# Patient Record
Sex: FEMALE | NOT HISPANIC OR LATINO | Employment: PART TIME | URBAN - METROPOLITAN AREA
[De-identification: names, ages, dates, MRNs, and addresses within clinical notes are randomized per-mention and may not be internally consistent; named-entity substitution may affect disease eponyms.]

---

## 2022-01-02 ENCOUNTER — APPOINTMENT (OUTPATIENT)
Dept: GENERAL RADIOLOGY | Facility: CLINIC | Age: 32
End: 2022-01-02
Attending: EMERGENCY MEDICINE

## 2022-01-02 ENCOUNTER — HOSPITAL ENCOUNTER (EMERGENCY)
Facility: CLINIC | Age: 32
Discharge: HOME | End: 2022-01-03
Attending: EMERGENCY MEDICINE | Admitting: EMERGENCY MEDICINE
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
## 2022-01-02 PROCEDURE — 250N000011 HC RX IP 250 OP 636: Performed by: EMERGENCY MEDICINE

## 2022-01-02 PROCEDURE — 250N000013 HC RX MED GY IP 250 OP 250 PS 637: Performed by: EMERGENCY MEDICINE

## 2022-01-02 PROCEDURE — 73130 X-RAY EXAM OF HAND: CPT | Mod: 50

## 2022-01-02 PROCEDURE — 96374 THER/PROPH/DIAG INJ IV PUSH: CPT | Mod: 59

## 2022-01-02 PROCEDURE — 29130 APPL FINGER SPLINT STATIC: CPT | Mod: F4

## 2022-01-02 PROCEDURE — 99284 EMERGENCY DEPT VISIT MOD MDM: CPT | Mod: 25

## 2022-01-02 RX ORDER — KETOROLAC TROMETHAMINE 15 MG/ML
15 INJECTION, SOLUTION INTRAMUSCULAR; INTRAVENOUS ONCE
Status: COMPLETED | OUTPATIENT
Start: 2022-01-02 | End: 2022-01-02

## 2022-01-02 RX ORDER — OXYCODONE AND ACETAMINOPHEN 5; 325 MG/1; MG/1
2 TABLET ORAL ONCE
Status: COMPLETED | OUTPATIENT
Start: 2022-01-02 | End: 2022-01-02

## 2022-01-02 RX ADMIN — KETOROLAC TROMETHAMINE 15 MG: 15 INJECTION, SOLUTION INTRAMUSCULAR; INTRAVENOUS at 22:43

## 2022-01-02 RX ADMIN — AMOXICILLIN AND CLAVULANATE POTASSIUM 1 TABLET: 875; 125 TABLET, FILM COATED ORAL at 22:44

## 2022-01-02 RX ADMIN — OXYCODONE HYDROCHLORIDE AND ACETAMINOPHEN 2 TABLET: 5; 325 TABLET ORAL at 23:49

## 2022-01-02 ASSESSMENT — MIFFLIN-ST. JEOR: SCORE: 1428.96

## 2022-01-03 VITALS
DIASTOLIC BLOOD PRESSURE: 78 MMHG | WEIGHT: 155 LBS | BODY MASS INDEX: 25.83 KG/M2 | SYSTOLIC BLOOD PRESSURE: 117 MMHG | HEIGHT: 65 IN | RESPIRATION RATE: 20 BRPM | OXYGEN SATURATION: 99 % | TEMPERATURE: 97.7 F | HEART RATE: 89 BPM

## 2022-01-03 NOTE — DISCHARGE INSTRUCTIONS
Take the prescribed antibiotic to reduce your risk of developing wound infection.  You can use ibuprofen and Tylenol as needed for pain.  [facility] with [facility] regarding your finger wound, especially if there are any signs of infection developing.

## 2022-01-03 NOTE — ED PROVIDER NOTES
"History   Chief Complaint:  Cat bites    HPI   History supplemented by electronic chart review    Douglas Leung is a 31 year old female who presents by EMS for evaluation of cat bites.  She states that just prior to arrival, she was at home with her 8-year-old child when she could tell that her cat was angry, and the cat was about to attack her child when she got in the way, and the cat bit the patient instead, with some bites to her right hand, left hand including left small finger, and several scratches to her lower legs and forehead.  She thinks her tetanus is up-to-date.  She was given 50 mcg of fentanyl by EMS.  No allergies to antibiotics.  Bleeding controlled prior to arrival.  She has discomfort to the site of the bites.  She has otherwise been feeling fine lately and has had no fevers, cough, trouble breathing, vomiting, or other symptoms.    Review of Systems  All other systems reviewed and negative except as above in HPI.    Allergies:  No Known Allergies     Medications:    No daily medications    Past Medical History:    No past medical history on file.    There are no problems to display for this patient.       Past Surgical History:    No past surgical history on file.     Family History:    family history is not on file.    Social History:   Lives with 9yo child    Physical Exam   Patient Vitals for the past 24 hrs:   BP Temp Temp src Pulse Resp SpO2 Height Weight   01/02/22 2125 121/72 97.7  F (36.5  C) Temporal 85 18 96 % 1.651 m (5' 5\") 70.3 kg (155 lb)      Physical Exam  General: Nontoxic-appearing woman sitting upright in room 27, wearing white closed with dried blood   CV: rate as above, no active bleeding, extremities well perfused, compartments soft in all extremities, normal bilateral radial pulses  Resp: normal respiratory effort  MSK: no surrounding bony tenderness or deformity, mild swelling to dorsum of right proximal hand around site of bites  Skin: Superficial scratches to " left forehead as well as bilateral lower legs without active bleeding.  To somewhat deeper laceration to the dorsum of the proximal right hand, no visible tendon or bone.  Laceration to the ulnar aspect of the left small finger overlying the middle phalanx without visible tendon or bone  Neuro: alert, surrounding sensation and motor function intact  Psych: cooperative and anxious    Emergency Department Course     Imaging:  XR Hand Bilateral G/E 3 Views   Final Result   IMPRESSION: Soft tissue swelling of the left fifth digit which is flexed at the proximal interphalangeal joint on all 3 images. There are a few lucencies in the dorsum of the soft tissues of the left hand consistent with gas and penetrating bite injury.    No displaced left hand fracture or radiopaque foreign body. Right hand demonstrates some soft tissue swelling in the region of the thenar eminence with a single tiny hypodensity which could reflect a small locule of gas. There is also swelling dorsally    over the metacarpals. No displaced right hand fracture or radiopaque foreign body.           Laboratory:  Labs Ordered and Resulted from Time of ED Arrival to Time of ED Departure - No data to display     Procedures:  Procedure - digital block and steri strips  Performed by: ASAEL Christy MD  After verbal consent was obtained from the patient, I used a 27-gauge needle to perform a digital block of the left small finger using 0.5% bupivacaine without epinephrine.  She then underwent meticulous wound care and cleansing by the ER tech of her various wounds.  I then returned to place quarter-inch Steri-Strips across the largest of the wounds on the dorsum of her right hand, and also placed several quarter inch Steri-Strips across her left small finger wound, with good wound edge approximation and hemostasis.  The patient tolerated the procedure well and there were no apparent complications.      Emergency Department Course:  Reviewed:  I reviewed  nursing notes, vitals, and past medical history    Assessments:  I obtained history and examined the patient as noted above.          Consults:   See above in ED Course    Interventions:  Medications   ketorolac (TORADOL) injection 15 mg (15 mg Intravenous Given 1/2/22 2243)   amoxicillin-clavulanate (AUGMENTIN) 875-125 MG per tablet 1 tablet (1 tablet Oral Given 1/2/22 2244)   oxyCODONE-acetaminophen (PERCOCET) 5-325 MG per tablet 2 tablet (2 tablets Oral Given 1/2/22 2349)      Disposition:  Discharged  Impression & Plan    Covid-19  Douglas Leung was evaluated during a global COVID-19 pandemic, which necessitated consideration that the patient might be at risk for infection with the SARS-CoV-2 virus that causes COVID-19.   Applicable protocols for evaluation were followed during the patient's care.   COVID-19 was considered as part of the patient's evaluation.     Medical Decision Making:  She presents with various injuries from cat scratches and several bite wounds.  Fortunately, x-ray does not reveal any worrisome radiopaque foreign body or bony injury.  She is neurovascularly intact.  We did discuss that she is at fairly high risk for infection given the nature of these injuries from a cat, and she agrees with the rationale for initiation of prophylactic antibiotics.  She was advised that there is still some risk of infection and she should return for spreading redness, high fevers, or other concerns.  She should use over-the-counter analgesics as needed.  I explained that one of the wounds on the dorsum of her right hand, and the wound on her left small finger, are large enough that they could potentially be sutured, though I do not think that sutures are in her best interest given the increased risk of wound infection if sutures were to be placed.  All questions answered prior to her discharge home.  Her left finger will be placed in AlumaFoam splint for soft tissue rest and she was given referral  information for follow-up with orthopedic hand specialty if wound healing concerns.    Diagnosis:    ICD-10-CM    1. Cat bite of multiple sites  W55.01XA         Discharge Medications:     Medication List      Started    amoxicillin-clavulanate 875-125 MG tablet  Commonly known as: AUGMENTIN  1 tablet, Oral, 2 TIMES DAILY             1/3/2022   ASAEL Christy MD    This note was completed in part using Dragon voice recognition software. Although reviewed after completion, some word and grammatical errors may occur.           Jimenez Christy MD  01/03/22 0052

## 2022-01-03 NOTE — ED TRIAGE NOTES
[facility]  ED Arrival Note    Arrives via EMS. ABC's intact. A &O X4. . Pt arrives with c/o cat attack. Presents with bites and scratches to both hands and face. Bleeding controlled. Given 50 mcg of Fentanyl per EMS. Cat is under 1 yr old and has not had rabbis shots.       Visitors during triage: None      Triage Interventions: N/A    Ambulatory: Yes    Meets Stroke Criteria?: No    Meets Trauma Criteria?: No    Shock Index: <0.8, for provider reference    Directed to: Triage Lobby    Pronouns: she/her

## 2022-01-03 NOTE — ED NOTES
Emergency Department Technician Wound Irrigation Note:    1/3/2022    1:12 AM      Wound location:  Bilateral hands and forearms.    Irrigation Fluid: Normal Saline    Estimated Irrigation Volume (60 mL fluid per cm): 600 mL    Artemio Brunson

## 2023-05-30 PROCEDURE — 99285 EMERGENCY DEPT VISIT HI MDM: CPT | Mod: 25

## 2023-05-31 ENCOUNTER — HOSPITAL ENCOUNTER (EMERGENCY)
Facility: CLINIC | Age: 33
Discharge: HOME | End: 2023-05-31
Attending: EMERGENCY MEDICINE | Admitting: EMERGENCY MEDICINE

## 2023-05-31 VITALS
BODY MASS INDEX: 25.66 KG/M2 | OXYGEN SATURATION: 98 % | HEART RATE: 78 BPM | WEIGHT: 154 LBS | SYSTOLIC BLOOD PRESSURE: 115 MMHG | RESPIRATION RATE: 14 BRPM | TEMPERATURE: 99 F | HEIGHT: 65 IN | DIASTOLIC BLOOD PRESSURE: 78 MMHG

## 2023-05-31 DIAGNOSIS — F41.0 PANIC ATTACK: ICD-10-CM

## 2023-05-31 DIAGNOSIS — F41.9 ANXIETY: ICD-10-CM

## 2023-05-31 LAB — SARS-COV-2 RNA RESP QL NAA+PROBE: NEGATIVE

## 2023-05-31 PROCEDURE — 90791 PSYCH DIAGNOSTIC EVALUATION: CPT

## 2023-05-31 PROCEDURE — 87635 SARS-COV-2 COVID-19 AMP PRB: CPT | Performed by: EMERGENCY MEDICINE

## 2023-05-31 ASSESSMENT — ACTIVITIES OF DAILY LIVING (ADL)
ADLS_ACUITY_SCORE: 35

## 2023-05-31 ASSESSMENT — COLUMBIA-SUICIDE SEVERITY RATING SCALE - C-SSRS
TOTAL  NUMBER OF ABORTED OR SELF INTERRUPTED ATTEMPTS LIFETIME: NO
1. HAVE YOU WISHED YOU WERE DEAD OR WISHED YOU COULD GO TO SLEEP AND NOT WAKE UP?: NO
ATTEMPT LIFETIME: NO
2. HAVE YOU ACTUALLY HAD ANY THOUGHTS OF KILLING YOURSELF?: NO
TOTAL  NUMBER OF INTERRUPTED ATTEMPTS LIFETIME: NO
6. HAVE YOU EVER DONE ANYTHING, STARTED TO DO ANYTHING, OR PREPARED TO DO ANYTHING TO END YOUR LIFE?: NO

## 2023-05-31 NOTE — DISCHARGE INSTRUCTIONS
Aftercare Plan    [facility] SCHEDULING:  Today you were seen by a licensed mental health professional through [facility] and Transition sevices, [facility] (P)  for a crisis assessment in the Emergency Department at [facility].  It is recommended that you follow up with your estabished providers (psychiatrist, mental health therapist, and/or primary care doctor - as relevant) as soon as possible. Coordinators from [facility] will be calling you in the next 24-48 hours to ensure that you have the resources you need.  You can also contact [facility] coordinators directly at [phone].    You have been scheduled the following appointments:   Date: Tuesday, 6/27/2023  Time: 8:45 am - 10:00 am  Provider: Yisel Oliva  MSN  CNP,RN  Location: [facility], [address] [place] [id number]  Phone: [phone]  Type: Medication Mgmt - Initial (In-Person)    Patient Instructions  Patients should arrive 30 minutes early for intake paperwork.      [facility] maintains an extensive network of licensed behavioral health providers to connect patients with the services they need.  We do not charge providers a fee to participate in our referral network.  We match patients with providers based on a patient s specific needs, insurance coverage, and location.  Our first effort will be to refer you to a provider within your care system, and will utilize providers outside your care system as needed.     If I am feeling unsafe or I am in a crisis, I will:   Contact my established care providers   Call the National Suicide Prevention Lifeline: 988  Go to the nearest emergency room   Call 911     Warning signs that I or other people might notice when a crisis is developing for me:   - feeling scared   -racing heart   -shaking  -thinking everything  -overwhelmed    Things I am able to do on my own to cope or help me feel better:   -Try to think about other things   -Take a deep breath and sit down if needed.  Think before acting.  -I can try practicing square breathing when I begin to feel anxious - inhale through the nose for the count of 4 and the first line on the square. Exhale through the mouth for the count of 4 for the second line of the square. Repeat to complete the square. Repeat the square as many times as needed.  -I can also use my five senses to practice mindfulness and grounding. What are five things I can see, four things I can hear, three things I can feel, two things I can smell, and one thing I can taste.  -Download a meditation monique and spend 15-20 minutes per day mediating/relaxing. Some apps to download include Calm, Headspace, and Insight Timer. All of these apps have free version.    Things that I am able to do with others to cope or help me better:   -Talking with others   -Commit to 30 minutes of self care daily. This can be as simple as taking a shower, going for a walk, cooking a meal, reading, writing, etc.  -I can also use community resources including mental health hotlines, [facility] crisis teams, or apps.    Things I can use or do for distraction:   -Move around   -pacing   -Call a friend or family member.  -Spend time outside.  -Go for a walk.  -Exercise  -Do chores.  -Do a project or favorite activity.  -Listen to music.  -Read.  -Journal your feelings.  -I can also download a meditation or relaxation monique, like Calm, Headspace, or Insight Timer (all three offer a free version).  -A great website resource is Change to Chill with active coping skills.    Changes I can make to support my mental health and wellness:   -Get at least 6-8 hours of sleep each night.  -Eat 3 nutritious meals per day.  -Take all of your medications as prescribed.  -Attend scheduled mental health therapy and psychiatric appointments and follow all recommendations.  -Maintain a daily schedule/routine.  -Practice deep breathing skills.  -Refrain from taking mood altering chemicals not currently prescribed to  "me.    People in my life that I can ask for help:   -PCP     Your county has a mental health crisis team you can call 24/7: [facility] Mobile Crisis  [phone]     Other things that are important when I'm in crisis: Remember that the feelings I am having right now are temporary and it won't feel like this forever. It is okay and important to ask for help.     Crisis Lines  Crisis Text Line  Text 207074  You will be connected with a trained live crisis counselor to provide support.    Por espanol, texto  KAPIL a 680496 o texto a 442-AYUDAME en WhatsApp    [employer] (LGBTQ Youth Crisis Line)  5.083.893.9695  text START to 303-153      Community Resources  Fast Tracker  Linking people to mental health and substance use disorder resources  [employer]     [place] Mental Health Warm Line  Peer to peer support  Monday thru Saturday, 12 pm to 10 pm  831.664.0914 or 8.803.289.1614  Text \"Support\" to 33987    National [place] on Mental Illness (JEAN PIERRE)  951.671.0532 or 1.888.JEAN PIERRE.HELPS      Mental Health Apps  My3  https://myActivePathpp.org/    VirtualHopeBox  https://[employer].org/apps/virtual-hope-box/      Additional Information  Today you were seen by a licensed mental health professional through Triage and Transition services, [facility] (P)  for a crisis assessment in the Emergency Department at [facility].  It is recommended that you follow up with your established providers (psychiatrist, mental health therapist, and/or primary care doctor - as relevant) as soon as possible. Coordinators from [facility] will be calling you in the next 24-48 hours to ensure that you have the resources you need.  You can also contact [facility] coordinators directly at [phone]. You may have been scheduled for or offered an appointment with a mental health provider. [facility] maintains an extensive network of licensed behavioral health providers to connect patients with the services they " need.  We do not charge providers a fee to participate in our referral network.  We match patients with providers based on a patient's specific needs, insurance coverage, and location.  Our first effort will be to refer you to a provider within your care system, and will utilize providers outside your care system as needed.

## 2023-05-31 NOTE — ED PROVIDER NOTES
"  History     Chief Complaint:  Panic Attack       A  was used (Azerbaijani).      Douglas Leung is a 32 year old female who presents via EMS due to anxiety. Jailyn states that beginning earlier this month she started to experience worsening anxiety that has caused frequent panic attacks. Tonight, Douglas states that her anxiety was so bad that she couldn't go to work, prompting her to come in. During evaluation, Douglas notes that she did take some hydroxyzine around 1900. She also reports that her anxiety has been so bad that she isn't able to do her normal daily activities as she's so fatigued from the constant anxiety and panic, describing that she \"feels devastated.\" She currently denies any suicidal ideation.     Independent Historian:   None - Patient Only    Review of External Notes:   Reviewed the note from [facility] where she was seen for similar presentation of anxiety, panic attacks.  Reviewed the recent blood work that she had done including hemoglobin, TSH and hemoglobin A1c    Medications:    Sertraline  Hydroxyzine    Past Medical History:    Panic disorder     Past Surgical History:    EGD     Physical Exam     Patient Vitals for the past 24 hrs:   BP Temp Temp src Pulse Resp SpO2 Height Weight   05/31/23 0216 122/76 -- -- 67 14 100 % -- --   05/31/23 0003 125/75 99  F (37.2  C) Oral 77 13 100 % 1.651 m (5' 5\") 69.9 kg (154 lb)        Physical Exam  General: Sitting up in bed  Eyes:  The pupils are equal and round    Conjunctivae and sclerae are normal  ENT:    Wearing a mask  Neck:  Normal range of motion  CV:  Regular rate     Skin warm and well perfused   Resp:  Non labored breathing on room air    No tachypnea    No cough heard  MS:  Normal muscular tone  Skin:  No rash or acute skin lesions noted  Neuro:   Awake, alert.      Speech is normal and fluent.    Face is symmetric.     Moves all extremities equally  Psych: Normal affect.  Appropriate interactions.    Emergency " Department Course     Laboratory:  Labs Ordered and Resulted from Time of ED Arrival to Time of ED Departure   COVID-19 VIRUS (CORONAVIRUS) BY PCR - Normal       Result Value    SARS CoV2 PCR Negative          Procedures   none    Emergency Department Course & Assessments:       Assessments:  0205 I obtained history and examined the patient as noted above.   0551 I rechecked and updated the patient. At this time, the patient was deemed safe to discharge home and she agreed to the plan of care.    Independent Interpretation (X-rays, CTs, rhythm strip):  None    Consultations/Discussion of Management or Tests:  0537 I spoke with the DEC accessor after her assessment of the patient. She reports that the patient would be safe for discharge.        Social Determinants of Health affecting care:   English as a second language    Disposition:  The patient was discharged to home.     Impression & Plan    CMS Diagnoses: None    Medical Decision Making:  Douglas Leung is a 32-year-old female who presented to the emergency department with anxiety.  Patient recently seen at [facility] for the same thing.  Has recently been started on medications for anxiety.  No safety concerns at this time and denies any suicidal ideation.  Doubt medical cause of her symptoms and reviewed her recent laboratory studies that were unremarkable.  Highly doubt PE or other acute medical cause of her symptoms.  DEC evaluated the patient and recommended discharge home.  Psychiatry appointment was set up for her.  She already has therapy appointment in June.  Patient discharged home.    Diagnosis:    ICD-10-CM    1. Anxiety  F41.9       2. Panic attack  F41.0            Scribe Disclosure:  I, Rachele Deal, am serving as a scribe at 2:10 AM on 5/31/2023 to document services personally performed by Luz Lehman MD based on my observations and the provider's statements to me.     5/31/2023   Luz Lehman MD Goertz, Maria  MD [place]  05/31/23 0558

## 2023-05-31 NOTE — CONSULTS
Diagnostic Evaluation Consultation  Crisis Assessment    Patient was assessed: In Person  Patient location: [facility] - Emergency Room, ED 13  Was a release of information signed: No. Reason: Cannot recall providers       Referral Data and Chief Complaint  Douglas Leung is a 32 year old, who uses she/her pronouns, and presents to the ED via EMS. Patient is referred to the ED by self. Patient is presenting to the ED for the following concerns: Panic attack.      Informed Consent and Assessment Methods     Patient is her own guardian. Writer met with patient and explained the crisis assessment process, including applicable information disclosures and limits to confidentiality, assessed understanding of the process, and obtained consent to proceed with the assessment. Patient was observed to be able to participate in the assessment as evidenced by verbal consent and engagement. Assessment methods included conducting a formal interview with patient, review of medical records, collaboration with medical staff, and obtaining relevant collateral information from family and community providers when available..     Over the course of this crisis assessment provided reassurance, offered validation, engaged patient in problem solving and disposition planning, worked with patient on safety and aftercare planning and provided psychoeducation. Patient's response to interventions was pleasant and cooperative. Patient was engaged and help seeking.      Summary of Patient Situation  Patient presents to the emergency room via ambulance for a panic attack. She reports today was feeling a lot of panic and she could not control it. Seemed like panic attacks were happening at least once an hour even when taking medication as prescribed. Patient says her heart was racing and her thoughts were scattered or disorganized. She was thinking about everything and anything. Patient reports no mental health history up  until the first panic attack on May 19, 2023. She states having approximately 20 panic attacks since then. 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 Patient reports she cannot work or do her daily activities anymore. States walking for daily tasks makes her feel tired.     Per chart review patient was also seen on 5/28/23 for the same presentation. [place] [facility] [place].5[place]. Providers there increased Zoloft and Vistaril and recommended following up with PCP.     Brief Psychosocial History  - Current living situation: Live in an apartment with her son.      - Brief family history: Has a 9 year old son. No history of mental health or substance use in the family.    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     - Gattman status: No     - Hobbies: Listening to music     - Supports: No one     - Relevant legal issues: No     - Cultural, Rastafari, or spiritual influences on mental health care: No    Significant Clinical History  - History of mental health (and) substance use crisis: Started feeling panic attacks started May 19, 2023. Nothing happened, she was working. No substance use.      - Symptom and diagnosis history: Reports symptoms as feeling scared, racing heart, shaking, thinking everything, and overwhelmed.     - Historic treatment team: [facility] prescribes medications, does not currently have a therapist but starting on June 26 but cannot recall provider    - Past hospitalization, commitments, [facility]/S[facility], treatment programs, and other therapuetic efforts: No psychiatric admissions, no commitments    - Relevant trauma history: No     Collateral Information  The following information was received from Carlton Romero whose  relationship to the patient is Friend. Information was obtained via phone. Their phone number is [phone] and they last had contact with patient on 5/28/23.    What happened today: Not sure, thinks he knew she was having panic attacks.     What is different about patient's functioning: Seemed like she had been sick or crying.     Concern about alcohol/drug use: No    What do you think the patient needs: No idea.     Has patient made comments about wanting to kill themselves/others:  No    If d/c is recommended, can they take part in safety/aftercare planning: No     Risk Assessment  Monona Suicide Severity Rating Scale Full Clinical Version: 5/31/23  Suicidal Ideation  1. Wish to be Dead (Lifetime): No  2. Non-Specific Active Suicidal Thoughts (Lifetime): No     Suicidal Behavior  Actual Attempt (Lifetime): No  Has subject engaged in non-suicidal self-injurious behavior? (Lifetime): No  Interrupted Attempts (Lifetime): No  Aborted or Self-Interrupted Attempt (Lifetime): No  Preparatory Acts or Behavior (Lifetime): No  C-SSRS Risk (Lifetime/Recent)  Calculated C-SSRS Risk Score (Lifetime/Recent): No Risk Indicated      Validity of evaluation is impacted by presenting factors during interview patient's first language is not english, she feels comfortable without an interpeter.   Comments regarding subjective versus objective responses to Monona tool: n/a  Environmental or Psychosocial Events: other life stressors and social isolation  Chronic Risk Factors: chronic and ongoing sleep difficulties   Warning Signs: none identified  Protective Factors: responsibilities and duties to others, including pets and children, lives in a responsibly safe and stable environment, sense of importance of health and wellness, help seeking, good impulse control, sense of belonging and reality testing ability  Interpretation of Risk Scoring, Risk Mitigation Interventions and Safety Plan:  Patient is identified as No Risk  98.1 Indicated via C-SSRS. Patient denies SI/HI. She reports no AH/VH. Patient reports that panic attacks started a few weeks ago. She has a therapy appointment scheduled on June 26, 2023 and this writer scheduled a psychiatrist appointment for June 27, 2023.    Does the patient have thoughts of harming others? No     Is the patient engaging in sexually inappropriate behavior?  no     Current Substance Abuse   Is there recent substance abuse? no     Was a urine drug screen or blood alcohol level obtained: No     Mental Status Exam   Affect: Blunted and Other: tired   Appearance: Appropriate    Attention Span/Concentration: Attentive  Eye Contact: Avoidant and Other: Patient kept blanket over her head and remained lying down   Fund of Knowledge: Appropriate    Language /Speech Content: Non-fluent   Language /Speech Volume: Soft    Language /Speech Rate/Productions: Minimally Responsive    Recent Memory: Intact   Remote Memory: Intact   Mood: Anxious    Orientation to Person: Yes    Orientation to Place: Yes   Orientation to Time of Day: Yes    Orientation to Date: Yes    Situation (Do they understand why they are here?): Yes    Psychomotor Behavior: Normal    Thought Content: Clear   Thought Form: Intact      History of commitment: No     Medication  Psychotropic medications: Yes. Pt is currently taking sertaline and another one every 3 hours. Medication compliant: Yes. Recent medication changes: No   Started on Friday  Medication changes made in the last two weeks: No     Current Care Team  Primary Care Provider: [facility] in [place] Dr. Aggarwal; prescribes medications  Psychiatrist: No  Therapist: No  : No     CTSS or ARMHS: No  ACT Team: No  Other: No    Diagnosis  300.00 (F41.9) Unspecified Anxiety Disorder - by history     Clinical Summary and Substantiation of Recommendations    After the period in observation care, the patient's circumstances and mental state were safe for outpatient management. Patient is  identified as No Risk Indicated via C-SSRS. Patient denies SI/HI. She reports no AH/VH. Patient reports that panic attacks started a few weeks ago. She has a therapy appointment scheduled on June 26, 2023 and this writer scheduled a psychiatrist appointment for June 27, 2023. After therapeutic treatment, intervention and aftercare planning by [facility] and consultation with attending provider, the patient was deemed appropriate for discharge after resting more. Close follow-up with a psychiatrist and/or therapist was recommended and community psychiatric resources were provided. Patient is to return to the ED if any urgent or potentially life-threatening concerns arise.     At the time of discharge, the patient's acute suicide risk was determined to be low due to the following factors: reduction in the intensity of mood/anxiety symptoms that preceded the admission, denial of suicidal thoughts, denies feeling helpless or hopeless, not currently under the influence of alcohol or illicit substances, denies experiencing command hallucinations and no immediate access to firearms. Protective factors include: voluntarily seeking mental health support, displays resiliency , future focused thinking, displays insight, expresses desire to engage in treatment, sense of obligation to people/pets and safe/stable housing   Disposition  Recommended disposition: Individual Therapy and Medication Management       Reviewed case and recommendations with attending provider. Attending Name: Luz Lehman MD       Attending concurs with disposition: Yes       Patient and/or validated legal guardian concurs with disposition: Yes       Final disposition: Individual therapy  and Medication management.     Inpatient Details (if applicable):       Outpatient Details (if applicable):   Aftercare plan and appointments placed in the AVS and provided to patient: Yes. Given to patient by RN    Was lethal means counseling provided as a part of  aftercare planning? No;       Assessment Details  Patient interview started at: 0505am and completed at: 0535am.     Total duration spent on the patient case in minutes: 1.25 hrs      CPT code(s) utilized: 10259 - Psychotherapy for Crisis - 60 (30-74*) min       Disha Contreras [facility], Psychotherapist Trainee  DEC - Triage & Transition Services  Callback: [phone]      Aftercare Plan    [facility] SCHEDULING:  Today you were seen by a licensed mental health professional through [facility] ([facility])  for a crisis assessment in the Emergency Department at [facility].  It is recommended that you follow up with your estabished providers (psychiatrist, mental health therapist, and/or primary care doctor - as relevant) as soon as possible. Coordinators from [facility] will be calling you in the next 24-48 hours to ensure that you have the resources you need.  You can also contact [facility] coordinators directly at [phone].    You have been scheduled the following appointments:   Date: Tuesday, 6/27/2023  Time: 8:45 am - 10:00 am  Provider: Yisel SILVESTRE  CNP,RN  Location: [place] [address]  Phone: [phone]  Type: Medication Mgmt - Initial (In-Person)    Patient Instructions  Patients should arrive 30 minutes early for intake paperwork.      [facility] maintains an extensive network of licensed behavioral health providers to connect patients with the services they need.  We do not charge providers a fee to participate in our referral network.  We match patients with providers based on a patient s specific needs, insurance coverage, and location.  Our first effort will be to refer you to a provider within your care system, and will utilize providers outside your care system as needed.     If I am feeling unsafe or I am in a crisis, I will:   Contact my established care providers   Call the National Suicide Prevention  Lifeline: 988  Go to the nearest emergency room   Call 911     Warning signs that I or other people might notice when a crisis is developing for me:   - feeling scared   -racing heart   -shaking  -thinking everything  -overwhelmed    Things I am able to do on my own to cope or help me feel better:   -Try to think about other things   -Take a deep breath and sit down if needed. Think before acting.  -I can try practicing square breathing when I begin to feel anxious - inhale through the nose for the count of 4 and the first line on the square. Exhale through the mouth for the count of 4 for the second line of the square. Repeat to complete the square. Repeat the square as many times as needed.  -I can also use my five senses to practice mindfulness and grounding. What are five things I can see, four things I can hear, three things I can feel, two things I can smell, and one thing I can taste.  -Download a meditation monique and spend 15-20 minutes per day mediating/relaxing. Some apps to download include Calm, Headspace, and Insight Timer. All of these apps have free version.    Things that I am able to do with others to cope or help me better:   -Talking with others   -Commit to 30 minutes of self care daily. This can be as simple as taking a shower, going for a walk, cooking a meal, reading, writing, etc.  -I can also use community resources including mental health hotlines, [facility] crisis teams, or apps.    Things I can use or do for distraction:   -Move around   -pacing   -Call a friend or family member.  -Spend time outside.  -Go for a walk.  -Exercise  -Do chores.  -Do a project or favorite activity.  -Listen to music.  -Read.  -Journal your feelings.  -I can also download a meditation or relaxation monique, like Calm, Headspace, or Insight Timer (all three offer a free version).  -A great website resource is Change to Chill with active coping skills.    Changes I can make to support my mental health and wellness:  "  -Get at least 6-8 hours of sleep each night.  -Eat 3 nutritious meals per day.  -Take all of your medications as prescribed.  -Attend scheduled mental health therapy and psychiatric appointments and follow all recommendations.  -Maintain a daily schedule/routine.  -Practice deep breathing skills.  -Refrain from taking mood altering chemicals not currently prescribed to me.    People in my life that I can ask for help:   -PCP     Your [facility] has a mental health crisis team you can call 24/7: [facility] Crisis  [phone]     Other things that are important when I'm in crisis: Remember that the feelings I am having right now are temporary and it won't feel like this forever. It is okay and important to ask for help.     Crisis Lines  Crisis Text Line  Text 444668  You will be connected with a trained live crisis counselor to provide support.    Por shelly, rosao  KAPIL a 405368 o texto a 442-AYUDAME en WhatsApp    The [employer] (LGBTQ Youth Crisis Line)  9.200.210.4954  text START to 613-195      New Screens  Fast Tracker  Linking people to mental health and substance use disorder resources  Clarassance.org     [place] Mental Health Warm Line  Peer to peer support  Monday thru Saturday, 12 pm to 10 pm  591.190.8810 or 0.389.251.5957  Text \"Support\" to 50025    National [place] [employer] Mental Illness ([place]AN [place]RE)  359.048.0017 or 1.888.JEAN PIERRE.HELPS      Mental Health Apps  My3  https://myCheckBonuspp.org/    VirtualHopeBox  https://[employer].org/apps/virtual-hope-box/      Additional Information  Today you were seen by a licensed mental health professional through Triage and Transition services, [facility] ([facility])  for a crisis assessment in the Emergency Department at [facility].  It is recommended that you follow up with your established providers (psychiatrist, mental health therapist, and/or primary care doctor - as relevant) as soon as possible. Coordinators " from [facility] will be calling you in the next 24-48 hours to ensure that you have the resources you need.  You can also contact [facility] coordinators directly at [phone]. You may have been scheduled for or offered an appointment with a mental health provider. [facility] maintains an extensive network of licensed behavioral health providers to connect patients with the services they need.  We do not charge providers a fee to participate in our referral network.  We match patients with providers based on a patient's specific needs, insurance coverage, and location.  Our first effort will be to refer you to a provider within your care system, and will utilize providers outside your care system as needed.

## 2023-05-31 NOTE — ED TRIAGE NOTES
Worsening anxiety/panic attacks to the point that she could not get up today to go to work tonight. Pt describes it as fatigue with just walking, SOB, shaking body. Took hydroxyzine last at 1900.     Triage Assessment     Row Name 05/31/23 0005       Triage Assessment (Adult)    Airway WDL WDL       Respiratory WDL    Respiratory WDL WDL       Skin Circulation/Temperature WDL    Skin Circulation/Temperature WDL WDL       Cardiac WDL    Cardiac WDL WDL       Peripheral/Neurovascular WDL    Peripheral Neurovascular WDL WDL       Cognitive/Neuro/Behavioral WDL    Cognitive/Neuro/Behavioral WDL WDL